# Patient Record
Sex: FEMALE | Race: WHITE | Employment: FULL TIME | URBAN - NONMETROPOLITAN AREA
[De-identification: names, ages, dates, MRNs, and addresses within clinical notes are randomized per-mention and may not be internally consistent; named-entity substitution may affect disease eponyms.]

---

## 2020-04-08 LAB
ABO, EXTERNAL RESULT: NORMAL
C. TRACHOMATIS, EXTERNAL RESULT: NORMAL
HIV, EXTERNAL RESULT: NORMAL
N. GONORRHOEAE, EXTERNAL RESULT: NORMAL
RH FACTOR, EXTERNAL RESULT: NORMAL
RPR, EXTERNAL RESULT: NORMAL
RUBELLA TITER, EXTERNAL RESULT: NORMAL

## 2020-10-13 LAB — GBS, EXTERNAL RESULT: NORMAL

## 2020-11-11 ENCOUNTER — ANESTHESIA EVENT (OUTPATIENT)
Dept: LABOR AND DELIVERY | Age: 35
End: 2020-11-11
Payer: COMMERCIAL

## 2020-11-11 ENCOUNTER — HOSPITAL ENCOUNTER (INPATIENT)
Age: 35
LOS: 2 days | Discharge: HOME | End: 2020-11-13
Attending: MIDWIFE | Admitting: MIDWIFE
Payer: COMMERCIAL

## 2020-11-11 ENCOUNTER — ANESTHESIA (OUTPATIENT)
Dept: LABOR AND DELIVERY | Age: 35
End: 2020-11-11
Payer: COMMERCIAL

## 2020-11-11 PROBLEM — Z34.90 TERM PREGNANCY: Status: ACTIVE | Noted: 2020-11-11

## 2020-11-11 PROBLEM — Z34.90 ENCOUNTER FOR INDUCTION OF LABOR: Status: ACTIVE | Noted: 2020-11-11

## 2020-11-11 LAB
ABSOLUTE EOS #: 0.08 K/UL (ref 0–0.44)
ABSOLUTE IMMATURE GRANULOCYTE: 0.05 K/UL (ref 0–0.3)
ABSOLUTE LYMPH #: 2.17 K/UL (ref 1.1–3.7)
ABSOLUTE MONO #: 0.54 K/UL (ref 0.1–1.2)
AMPHETAMINE SCREEN URINE: NEGATIVE
BARBITURATE SCREEN URINE: NEGATIVE
BASOPHILS # BLD: 0 % (ref 0–2)
BASOPHILS ABSOLUTE: <0.03 K/UL (ref 0–0.2)
BENZODIAZEPINE SCREEN, URINE: NEGATIVE
BUPRENORPHINE URINE: NEGATIVE
CANNABINOID SCREEN URINE: NEGATIVE
COCAINE METABOLITE, URINE: NEGATIVE
DIFFERENTIAL TYPE: ABNORMAL
EOSINOPHILS RELATIVE PERCENT: 1 % (ref 1–4)
GLUCOSE BLD-MCNC: 67 MG/DL (ref 74–100)
HCT VFR BLD CALC: 33.2 % (ref 36.3–47.1)
HEMOGLOBIN: 11.1 G/DL (ref 11.9–15.1)
HEPATITIS B SURFACE ANTIGEN: NONREACTIVE
IMMATURE GRANULOCYTES: 1 %
LYMPHOCYTES # BLD: 22 % (ref 24–43)
MCH RBC QN AUTO: 32.1 PG (ref 25.2–33.5)
MCHC RBC AUTO-ENTMCNC: 33.4 G/DL (ref 28.4–34.8)
MCV RBC AUTO: 96 FL (ref 82.6–102.9)
MDMA URINE: NORMAL
METHADONE SCREEN, URINE: NEGATIVE
METHAMPHETAMINE, URINE: NEGATIVE
MONOCYTES # BLD: 5 % (ref 3–12)
NRBC AUTOMATED: 0 PER 100 WBC
OPIATES, URINE: NEGATIVE
OXYCODONE SCREEN URINE: NEGATIVE
PDW BLD-RTO: 12.8 % (ref 11.8–14.4)
PHENCYCLIDINE, URINE: NEGATIVE
PLATELET # BLD: 142 K/UL (ref 138–453)
PLATELET ESTIMATE: ABNORMAL
PMV BLD AUTO: 11.6 FL (ref 8.1–13.5)
PROPOXYPHENE, URINE: NEGATIVE
RBC # BLD: 3.46 M/UL (ref 3.95–5.11)
RBC # BLD: ABNORMAL 10*6/UL
SEG NEUTROPHILS: 71 % (ref 36–65)
SEGMENTED NEUTROPHILS ABSOLUTE COUNT: 7.23 K/UL (ref 1.5–8.1)
TEST INFORMATION: NORMAL
TRICYCLIC ANTIDEPRESSANTS, UR: NEGATIVE
WBC # BLD: 10.1 K/UL (ref 3.5–11.3)
WBC # BLD: ABNORMAL 10*3/UL

## 2020-11-11 PROCEDURE — 1220000000 HC SEMI PRIVATE OB R&B

## 2020-11-11 PROCEDURE — 87491 CHLMYD TRACH DNA AMP PROBE: CPT

## 2020-11-11 PROCEDURE — 7200000001 HC VAGINAL DELIVERY

## 2020-11-11 PROCEDURE — 36415 COLL VENOUS BLD VENIPUNCTURE: CPT

## 2020-11-11 PROCEDURE — 87340 HEPATITIS B SURFACE AG IA: CPT

## 2020-11-11 PROCEDURE — 87591 N.GONORRHOEAE DNA AMP PROB: CPT

## 2020-11-11 PROCEDURE — 2500000003 HC RX 250 WO HCPCS: Performed by: NURSE ANESTHETIST, CERTIFIED REGISTERED

## 2020-11-11 PROCEDURE — 6360000002 HC RX W HCPCS: Performed by: NURSE ANESTHETIST, CERTIFIED REGISTERED

## 2020-11-11 PROCEDURE — 6360000002 HC RX W HCPCS: Performed by: MIDWIFE

## 2020-11-11 PROCEDURE — 82947 ASSAY GLUCOSE BLOOD QUANT: CPT

## 2020-11-11 PROCEDURE — 2580000003 HC RX 258: Performed by: MIDWIFE

## 2020-11-11 PROCEDURE — 85025 COMPLETE CBC W/AUTO DIFF WBC: CPT

## 2020-11-11 PROCEDURE — 0UQJXZZ REPAIR CLITORIS, EXTERNAL APPROACH: ICD-10-PCS | Performed by: MIDWIFE

## 2020-11-11 PROCEDURE — 80306 DRUG TEST PRSMV INSTRMNT: CPT

## 2020-11-11 PROCEDURE — 3700000025 EPIDURAL BLOCK: Performed by: NURSE ANESTHETIST, CERTIFIED REGISTERED

## 2020-11-11 PROCEDURE — 6370000000 HC RX 637 (ALT 250 FOR IP): Performed by: MIDWIFE

## 2020-11-11 PROCEDURE — 3E033VJ INTRODUCTION OF OTHER HORMONE INTO PERIPHERAL VEIN, PERCUTANEOUS APPROACH: ICD-10-PCS | Performed by: MIDWIFE

## 2020-11-11 RX ORDER — NICOTINE 21 MG/24HR
1 PATCH, TRANSDERMAL 24 HOURS TRANSDERMAL DAILY PRN
Status: DISCONTINUED | OUTPATIENT
Start: 2020-11-11 | End: 2020-11-13 | Stop reason: HOSPADM

## 2020-11-11 RX ORDER — METHYLERGONOVINE MALEATE 0.2 MG/ML
200 INJECTION INTRAVENOUS
Status: ACTIVE | OUTPATIENT
Start: 2020-11-11 | End: 2020-11-11

## 2020-11-11 RX ORDER — ROPIVACAINE HYDROCHLORIDE 2 MG/ML
12 INJECTION, SOLUTION EPIDURAL; INFILTRATION; PERINEURAL ONCE
Status: DISCONTINUED | OUTPATIENT
Start: 2020-11-11 | End: 2020-11-11

## 2020-11-11 RX ORDER — METHYLERGONOVINE MALEATE 0.2 MG/ML
200 INJECTION INTRAVENOUS PRN
Status: DISCONTINUED | OUTPATIENT
Start: 2020-11-11 | End: 2020-11-11

## 2020-11-11 RX ORDER — FERROUS SULFATE 325(65) MG
325 TABLET ORAL 2 TIMES DAILY
COMMUNITY

## 2020-11-11 RX ORDER — CARBOPROST TROMETHAMINE 250 UG/ML
250 INJECTION, SOLUTION INTRAMUSCULAR PRN
Status: DISCONTINUED | OUTPATIENT
Start: 2020-11-11 | End: 2020-11-11

## 2020-11-11 RX ORDER — SEVOFLURANE 250 ML/250ML
1 LIQUID RESPIRATORY (INHALATION) CONTINUOUS PRN
Status: DISCONTINUED | OUTPATIENT
Start: 2020-11-11 | End: 2020-11-11

## 2020-11-11 RX ORDER — SODIUM CHLORIDE 0.9 % (FLUSH) 0.9 %
10 SYRINGE (ML) INJECTION PRN
Status: DISCONTINUED | OUTPATIENT
Start: 2020-11-11 | End: 2020-11-13 | Stop reason: HOSPADM

## 2020-11-11 RX ORDER — MODIFIED LANOLIN
OINTMENT (GRAM) TOPICAL PRN
Status: DISCONTINUED | OUTPATIENT
Start: 2020-11-11 | End: 2020-11-13 | Stop reason: HOSPADM

## 2020-11-11 RX ORDER — LIDOCAINE HYDROCHLORIDE AND EPINEPHRINE 10; 10 MG/ML; UG/ML
INJECTION, SOLUTION INFILTRATION; PERINEURAL
Status: DISCONTINUED
Start: 2020-11-11 | End: 2020-11-11 | Stop reason: WASHOUT

## 2020-11-11 RX ORDER — LIDOCAINE HYDROCHLORIDE 20 MG/ML
INJECTION, SOLUTION EPIDURAL; INFILTRATION; INTRACAUDAL; PERINEURAL PRN
Status: DISCONTINUED | OUTPATIENT
Start: 2020-11-11 | End: 2020-11-11 | Stop reason: SDUPTHER

## 2020-11-11 RX ORDER — CALCIUM CARBONATE 500(1250)
500 TABLET ORAL DAILY
COMMUNITY

## 2020-11-11 RX ORDER — IBUPROFEN 800 MG/1
800 TABLET ORAL EVERY 8 HOURS
Status: DISCONTINUED | OUTPATIENT
Start: 2020-11-11 | End: 2020-11-13 | Stop reason: HOSPADM

## 2020-11-11 RX ORDER — ACETAMINOPHEN 325 MG/1
650 TABLET ORAL EVERY 4 HOURS PRN
Status: DISCONTINUED | OUTPATIENT
Start: 2020-11-11 | End: 2020-11-13 | Stop reason: HOSPADM

## 2020-11-11 RX ORDER — DOCUSATE SODIUM 100 MG/1
100 CAPSULE, LIQUID FILLED ORAL 2 TIMES DAILY
COMMUNITY

## 2020-11-11 RX ORDER — DOCUSATE SODIUM 100 MG/1
100 CAPSULE, LIQUID FILLED ORAL 2 TIMES DAILY PRN
Status: DISCONTINUED | OUTPATIENT
Start: 2020-11-11 | End: 2020-11-13 | Stop reason: HOSPADM

## 2020-11-11 RX ORDER — ACETAMINOPHEN 325 MG/1
650 TABLET ORAL EVERY 4 HOURS PRN
Status: DISCONTINUED | OUTPATIENT
Start: 2020-11-11 | End: 2020-11-11

## 2020-11-11 RX ORDER — SODIUM CHLORIDE 0.9 % (FLUSH) 0.9 %
10 SYRINGE (ML) INJECTION EVERY 12 HOURS SCHEDULED
Status: DISCONTINUED | OUTPATIENT
Start: 2020-11-11 | End: 2020-11-11

## 2020-11-11 RX ORDER — ROPIVACAINE HYDROCHLORIDE 2 MG/ML
INJECTION, SOLUTION EPIDURAL; INFILTRATION; PERINEURAL CONTINUOUS PRN
Status: DISCONTINUED | OUTPATIENT
Start: 2020-11-11 | End: 2020-11-11 | Stop reason: SDUPTHER

## 2020-11-11 RX ORDER — SODIUM CHLORIDE 0.9 % (FLUSH) 0.9 %
10 SYRINGE (ML) INJECTION EVERY 12 HOURS SCHEDULED
Status: DISCONTINUED | OUTPATIENT
Start: 2020-11-11 | End: 2020-11-13 | Stop reason: HOSPADM

## 2020-11-11 RX ORDER — EPHEDRINE SULFATE/0.9% NACL/PF 50 MG/5 ML
5 SYRINGE (ML) INTRAVENOUS EVERY 5 MIN PRN
Status: DISCONTINUED | OUTPATIENT
Start: 2020-11-11 | End: 2020-11-11

## 2020-11-11 RX ORDER — SODIUM CHLORIDE 0.9 % (FLUSH) 0.9 %
10 SYRINGE (ML) INJECTION PRN
Status: DISCONTINUED | OUTPATIENT
Start: 2020-11-11 | End: 2020-11-11

## 2020-11-11 RX ORDER — MISOPROSTOL 100 UG/1
900 TABLET ORAL PRN
Status: DISCONTINUED | OUTPATIENT
Start: 2020-11-11 | End: 2020-11-11

## 2020-11-11 RX ORDER — SODIUM CHLORIDE, SODIUM LACTATE, POTASSIUM CHLORIDE, CALCIUM CHLORIDE 600; 310; 30; 20 MG/100ML; MG/100ML; MG/100ML; MG/100ML
INJECTION, SOLUTION INTRAVENOUS CONTINUOUS
Status: DISCONTINUED | OUTPATIENT
Start: 2020-11-11 | End: 2020-11-11

## 2020-11-11 RX ORDER — SERTRALINE HYDROCHLORIDE 25 MG/1
25 TABLET, FILM COATED ORAL 2 TIMES DAILY
Status: DISCONTINUED | OUTPATIENT
Start: 2020-11-11 | End: 2020-11-13 | Stop reason: HOSPADM

## 2020-11-11 RX ORDER — ONDANSETRON 2 MG/ML
4 INJECTION INTRAMUSCULAR; INTRAVENOUS EVERY 6 HOURS PRN
Status: DISCONTINUED | OUTPATIENT
Start: 2020-11-11 | End: 2020-11-11

## 2020-11-11 RX ORDER — SERTRALINE HYDROCHLORIDE 25 MG/1
25 TABLET, FILM COATED ORAL 2 TIMES DAILY
COMMUNITY

## 2020-11-11 RX ORDER — OMEPRAZOLE 10 MG/1
10 CAPSULE, DELAYED RELEASE ORAL DAILY
COMMUNITY

## 2020-11-11 RX ORDER — LIDOCAINE HYDROCHLORIDE 10 MG/ML
30 INJECTION, SOLUTION EPIDURAL; INFILTRATION; INTRACAUDAL; PERINEURAL PRN
Status: DISCONTINUED | OUTPATIENT
Start: 2020-11-11 | End: 2020-11-11

## 2020-11-11 RX ADMIN — ROPIVACAINE HYDROCHLORIDE 12 ML/HR: 2 INJECTION, SOLUTION EPIDURAL; INFILTRATION at 12:00

## 2020-11-11 RX ADMIN — OXYTOCIN 1 MILLI-UNITS/MIN: 10 INJECTION INTRAVENOUS at 07:45

## 2020-11-11 RX ADMIN — SODIUM CHLORIDE, POTASSIUM CHLORIDE, SODIUM LACTATE AND CALCIUM CHLORIDE: 600; 310; 30; 20 INJECTION, SOLUTION INTRAVENOUS at 07:45

## 2020-11-11 RX ADMIN — SODIUM CHLORIDE, POTASSIUM CHLORIDE, SODIUM LACTATE AND CALCIUM CHLORIDE: 600; 310; 30; 20 INJECTION, SOLUTION INTRAVENOUS at 11:45

## 2020-11-11 RX ADMIN — SERTRALINE HYDROCHLORIDE 25 MG: 25 TABLET ORAL at 21:11

## 2020-11-11 RX ADMIN — LIDOCAINE HYDROCHLORIDE 100 MG: 20 INJECTION, SOLUTION EPIDURAL; INFILTRATION; INTRACAUDAL; PERINEURAL at 12:00

## 2020-11-11 RX ADMIN — IBUPROFEN 800 MG: 800 TABLET ORAL at 19:14

## 2020-11-11 RX ADMIN — SODIUM CHLORIDE, POTASSIUM CHLORIDE, SODIUM LACTATE AND CALCIUM CHLORIDE: 600; 310; 30; 20 INJECTION, SOLUTION INTRAVENOUS at 12:45

## 2020-11-11 RX ADMIN — Medication 40 EACH: at 22:09

## 2020-11-11 RX ADMIN — BENZOCAINE AND LEVOMENTHOL: 200; 5 SPRAY TOPICAL at 22:09

## 2020-11-11 RX ADMIN — LIDOCAINE HYDROCHLORIDE 100 MG: 20 INJECTION, SOLUTION EPIDURAL; INFILTRATION; INTRACAUDAL; PERINEURAL at 11:55

## 2020-11-11 ASSESSMENT — PAIN SCALES - GENERAL: PAINLEVEL_OUTOF10: 2

## 2020-11-11 NOTE — ANESTHESIA PROCEDURE NOTES
Epidural Block    Patient location during procedure: OB  Start time: 11/11/2020 11:44 AM  End time: 11/11/2020 12:00 PM  Reason for block: labor epidural  Staffing  Resident/CRNA: SANTOS Walker CRNA  Performed: resident/CRNA   Preanesthetic Checklist  Completed: patient identified, site marked, pre-op evaluation, timeout performed, IV checked, risks and benefits discussed, monitors and equipment checked, anesthesia consent given, oxygen available and patient being monitored  Epidural  Patient position: sitting  Prep: ChloraPrep and site prepped and draped  Patient monitoring: continuous pulse ox and frequent blood pressure checks  Approach: midline  Location: lumbar (1-5)  Injection technique: RUBEN saline  Procedures: anatomy guided.   Provider prep: mask and sterile gloves  Needle  Needle type: Tuohy   Needle gauge: 17 G  Needle length: 3.5 in  Needle insertion depth: 6 cm  Catheter type: side hole  Catheter size: 19 G  Catheter at skin depth: 6 cm  Test dose: negative  Kit: Hutchison  Lot number: [id number]  Expiration date: 3/30/2021  Assessment  Sensory level: T4  Hemodynamics: stable  Attempts: 1

## 2020-11-11 NOTE — ANESTHESIA PRE PROCEDURE
Department of Anesthesiology  Preprocedure Note       Name:  Jimmy Tai   Age:  30 y.o.  :  1985                                          MRN:  [id number]         Date:  2020      Surgeon: * No surgeons listed *    Procedure: * No procedures listed *    Medications prior to admission:   Prior to Admission medications    Medication Sig Start Date End Date Taking?  Authorizing Provider   metFORMIN (GLUCOPHAGE) 500 MG tablet Take 500 mg by mouth 2 times daily (with meals)   Yes Historical Provider, MD   ferrous sulfate (IRON 325) 325 (65 Fe) MG tablet Take 325 mg by mouth 2 times daily   Yes Historical Provider, MD   omeprazole (PRILOSEC) 10 MG delayed release capsule Take 10 mg by mouth daily   Yes Historical Provider, MD   sertraline (ZOLOFT) 25 MG tablet Take 25 mg by mouth 2 times daily   Yes Historical Provider, MD   docusate sodium (COLACE) 100 MG capsule Take 100 mg by mouth 2 times daily   Yes Historical Provider, MD   calcium carbonate (OSCAL) 500 MG TABS tablet Take 500 mg by mouth daily   Yes Historical Provider, MD   [id number] PO Take by mouth   Yes Historical Provider, MD       Current medications:    Current Facility-Administered Medications   Medication Dose Route Frequency Provider Last Rate Last Dose    lactated ringers infusion   Intravenous Continuous Xin Woodsonkeeper, APRN -  mL/hr at 20 0745      sodium chloride flush 0.9 % injection 10 mL  10 mL Intravenous 2 times per day Xin , APRN - CNM        sodium chloride flush 0.9 % injection 10 mL  10 mL Intravenous PRN Xin , APRN - CNM        lidocaine PF 1 % injection 30 mL  30 mL Other PRN Xin , APRN - CNM        butorphanol (STADOL) injection 1 mg  1 mg Intravenous Q3H PRN Xin , APRN - CNM        nitrous oxide 50% inhalation 1 each  1 each Inhalation Continuous PRN Xin , APRN - CNM        acetaminophen (TYLENOL) tablet 650 mg  650 mg Oral Q4H PRN Xin Stevensoneper, BP Readings from Last 3 Encounters:   11/11/20 124/86       NPO Status:                                                                                 BMI:   Wt Readings from Last 3 Encounters:   No data found for Wt     There is no height or weight on file to calculate BMI.    CBC:   Lab Results   Component Value Date    WBC 10.1 11/11/2020    RBC 3.46 11/11/2020    HGB 11.1 11/11/2020    HCT 33.2 11/11/2020    MCV 96.0 11/11/2020    RDW 12.8 11/11/2020     11/11/2020       CMP: No results found for: NA, K, CL, CO2, BUN, CREATININE, GFRAA, AGRATIO, LABGLOM, GLUCOSE, PROT, CALCIUM, BILITOT, ALKPHOS, AST, ALT    POC Tests:   Recent Labs     11/11/20  0742   POCGLU 67*       Coags: No results found for: PROTIME, INR, APTT    HCG (If Applicable): No results found for: PREGTESTUR, PREGSERUM, HCG, HCGQUANT     ABGs: No results found for: PHART, PO2ART, XYB1GIJ, RAY9LPL, BEART, K6HPKBQV     Type & Screen (If Applicable):  No results found for: LABABO, LABRH    Drug/Infectious Status (If Applicable):  No results found for: HIV, HEPCAB    COVID-19 Screening (If Applicable): No results found for: COVID19      Anesthesia Evaluation  Patient summary reviewed and Nursing notes reviewed no history of anesthetic complications:   Airway: Mallampati: II  TM distance: >3 FB   Neck ROM: full  Mouth opening: > = 3 FB Dental: normal exam         Pulmonary:normal exam    (+) recent URI:                            ROS comment: Patient has a hoarse voice. When I asked if she smoked she said no but she reports she recently tested positive for COVID-19.    Cardiovascular:Negative CV ROS  Exercise tolerance: good (>4 METS),            Beta Blocker:  Not on Beta Blocker         Neuro/Psych:   Negative Neuro/Psych ROS              GI/Hepatic/Renal: Neg GI/Hepatic/Renal ROS            Endo/Other:    (+) Diabetes (gestational.)Type II DM, , .                 Abdominal:           Vascular: negative vascular ROS. Anesthesia Plan      epidural     ASA 2             Anesthetic plan and risks discussed with patient. Plan discussed with CRNA.                   SANTOS Gagnon - CRNA   11/11/2020

## 2020-11-11 NOTE — PLAN OF CARE
Problem: Anxiety:  Goal: Level of anxiety will decrease  Description: Level of anxiety will decrease  2020 by Flor Layton RN  Outcome: Completed  2020 by Flor Layton RN  Outcome: Ongoing     Problem: Breathing Pattern - Ineffective:  Goal: Able to breathe comfortably  Description: Able to breathe comfortably  2020 by Flor Layton RN  Outcome: Completed  2020 by Flor Layton RN  Outcome: Ongoing     Problem: Infection - Intrapartum Infection:  Goal: Will show no infection signs and symptoms  Description: Will show no infection signs and symptoms  2020 by Flor Layton RN  Outcome: Completed  2020 by Flor Layton RN  Outcome: Ongoing     Problem: Labor Process - Prolonged:  Goal: Labor progression, first stage, within specified pattern  Description: Labor progression, first stage, within specified pattern  2020 by Flor Layton RN  Outcome: Completed  2020 by Flor Layton RN  Outcome: Ongoing  Goal: Labor progession, second stage, within specified pattern  Description: Labor progession, second stage, within specified pattern  2020 by Flor Layton RN  Outcome: Completed  2020 by Flor Layton RN  Outcome: Ongoing  Goal: Uterine contractions within specified parameters  Description: Uterine contractions within specified parameters  2020 by Flor Layton RN  Outcome: Completed  2020 by Flor Layton RN  Outcome: Ongoing     Problem:  Screening:  Goal: Ability to make informed decisions regarding treatment has improved  Description: Ability to make informed decisions regarding treatment has improved  2020 by Flor Layton RN  Outcome: Completed  2020 by Flor Layton RN  Outcome: Ongoing     Problem: Pain - Acute:  Goal: Pain level will decrease  Description: Pain level will decrease  11/11/2020 1710 by Jenny Garay RN  Outcome: Completed  11/11/2020 0931 by Jenny Garay RN  Outcome: Ongoing  Goal: Able to cope with pain  Description: Able to cope with pain  11/11/2020 1710 by Jenny Garay RN  Outcome: Completed  11/11/2020 0931 by Jenny Garay RN  Outcome: Ongoing     Problem: Tissue Perfusion - Uteroplacental, Altered:  Description: [TRUNCATED] For intrapartum patients with recurrent variable decelerations of the fetal heart rate, consider transcervical amnioinfusion. For patients in labor, avoid prophylactic use of continuous maternal oxygen supplementation to prevent nonreassu . ..   Goal: Absence of abnormal fetal heart rate pattern  Description: Absence of abnormal fetal heart rate pattern  11/11/2020 1710 by Jenny Garay RN  Outcome: Completed  11/11/2020 0931 by Jenny Garay RN  Outcome: Ongoing     Problem: Urinary Retention:  Goal: Experiences of bladder distention will decrease  Description: Experiences of bladder distention will decrease  11/11/2020 1710 by Jenny Garay RN  Outcome: Completed  11/11/2020 0931 by Jenny Garay RN  Outcome: Ongoing  Goal: Urinary elimination within specified parameters  Description: Urinary elimination within specified parameters  11/11/2020 1710 by Jenny Garay RN  Outcome: Completed  11/11/2020 0931 by Jenny Garay RN  Outcome: Ongoing

## 2020-11-11 NOTE — L&D DELIVERY NOTE
Jenny Garay, RN Registered Nurse    Guzman Amos LPN LPN      Cord    Vessels:  3 Vessels  Complications:  Nuchal  Nuchal intervention:  reduced  Nuchal cord description:  loose nuchal cord  Number of loops:  1  Delayed cord clamping?:  Yes  Cord clamped date/time:  2020 1419  Cord blood disposition:  Lab  Gases sent?:  No  Stem cell collection (by provider): No     Placenta    Date/time:  2020 16:21:00  Removal:  Spontaneous  Appearance:  Intact  Disposition:  Discarded     Delivery Resuscitation    Method:  Stimulation     Apgars    Living status:  Living  Apgars   1 Minute:   5 Minute:   10 Minute 15 Minute 20 Minute   Skin Color: 1  1       Heart Rate: 2  2       Reflex Irritability: 2  2       Muscle Tone: 2  2       Respiratory Effort: 2  2       Total: 9  9               Apgars Assigned By:  ANDREW HERRERA RN     Skin to Skin    Skin to skin initiation date/time: 20 16:19:00   Skin to skin with: Mother  Skin to skin end date/time:        [place] Measurements       Delivery Information    Episiotomy:  None  Perineal lacerations:  None    Labial laceration:  bilateral Repaired:  Yes   Vaginal laceration:  No    Cervical laceration:  No    Vaginal delivery est. blood loss (mL):  200  Surgical or additional est. blood loss (mL):  0 (View Only):  Edit in Flowsheets   Combined est. blood loss (mL):  200     Vaginal Delivery Counts    Initial count personnel:  Saad Alcala  Initial count verified by:  ANDREW HERRERA RN   4x4:   Needles:   Instruments:   Lap Pads:   Sponges:     Initial counts:          Final counts:          Final count personnel:  TAMI KISER  Final count verified by:  Maurisio Mars  Accurate final count?:  Yes     Other Procedures    Procedures:  None          Department of Obstetrics and Gynecology  Spontaneous Vaginal Delivery Note          Pre-operative Diagnosis:  Active Problems:    Encounter for induction of labor    Term pregnancy  Resolved Problems:    * No resolved hospital problems. *      Post-operative Diagnosis:  Living  infant(s) and Male    Blood Type and Rh: AB+       Condition:  infant stable and mother stable remain bonding in delivery room      Details of Procedure: The patient is a 30 y.o. female at 38w11d   OB History        4    Para   3    Term   3            AB        Living   3       SAB        TAB        Ectopic        Molar        Multiple        Live Births   3             who was admitted for induction. She received the following interventions: Edyta Lacrosse She was known to be GBS negative and did not receive antibiotic prophylaxis. The patient progressed well,did receive an epidural, became complete and started to push. After pushing for 10 minutes  the fetal head was at the perineum,  the rest of the infant delivered atraumatically, placed on mother abdomen. 1Nucal Cord was noted. Cord was clamped and cut per Iqra Suarez, father of the baby . The delivery of the placenta was spontaneous. Placenta was inspectedintact. The perineum and vagina were explored and a bilateral labial laceration and suresh clitoral laceration were noted. All lacerations were repaired and 4-0 vicryl on an S-H needles was used for repair. Patient had great pain relief from epidural, and no local was needed. Hemostasis was achieved after lacerations were repaired. Patient tolerated procedure well.

## 2020-11-11 NOTE — H&P
Department of Obstetrics and Gynecology   Obstetrics History and Physical  H&P Admission Inpatient  Note        CHIEF COMPLAINT:  i'm uncomfortable and I want to be induced. HISTORY OF PRESENT ILLNESS:      The patient is a 30 y.o. female at 38w11d. OB History        4    Para   3    Term   3            AB        Living   3       SAB        TAB        Ectopic        Molar        Multiple        Live Births   3            Patient presents with a chief complaint as above and is being admitted for induction    Estimated Due Date: Estimated Date of Delivery: 20    PRENATAL CARE:    Complicated by: gestational diabetes class A , non compliance with diet and reporting blood sugar readings. PAST OB HISTORY:  OB History        4    Para   3    Term   3            AB        Living   3       SAB        TAB        Ectopic        Molar        Multiple        Live Births   3                Past Medical History:    No past medical history on file. Past Surgical History:        Procedure Laterality Date    ABDOMEN SURGERY      umbilical hernia and abdominal hernia and left ovary removed.  CHOLECYSTECTOMY       Allergies:  Patient has no known allergies.     Social History:    Social History     Socioeconomic History    Marital status:      Spouse name: Not on file    Number of children: Not on file    Years of education: Not on file    Highest education level: Not on file   Occupational History    Not on file   Social Needs    Financial resource strain: Not on file    Food insecurity     Worry: Not on file     Inability: Not on file   [employer] needs     Medical: Not on file     Non-medical: Not on file   Tobacco Use    Smoking status: Current Every Day Smoker     Packs/day: 0.50     Types: Cigarettes    Smokeless tobacco: Never Used   Substance and Sexual Activity    Alcohol use: Not Currently    Drug use: Never    Sexual activity: Yes     Partners: Male Lifestyle    Physical activity     Days per week: Not on file     Minutes per session: Not on file    Stress: Not on file   Relationships    Social connections     Talks on phone: Not on file     Gets together: Not on file     Attends [employer] service: Not on file     Active member of club or organization: Not on file     Attends meetings of clubs or organizations: Not on file     Relationship status: Not on file    Intimate partner violence     Fear of current or ex partner: Not on file     Emotionally abused: Not on file     Physically abused: Not on file     Forced sexual activity: Not on file   Other Topics Concern    Not on file   Social History Narrative    Not on file     Family History:       Problem Relation Age of Onset    Anemia Mother     High Blood Pressure Mother      Medications Prior to Admission:  Medications Prior to Admission: metFORMIN (GLUCOPHAGE) 500 MG tablet, Take 500 mg by mouth 2 times daily (with meals)  ferrous sulfate (IRON 325) 325 (65 Fe) MG tablet, Take 325 mg by mouth 2 times daily  omeprazole (PRILOSEC) 10 MG delayed release capsule, Take 10 mg by mouth daily  sertraline (ZOLOFT) 25 MG tablet, Take 25 mg by mouth 2 times daily  docusate sodium (COLACE) 100 MG capsule, Take 100 mg by mouth 2 times daily  calcium carbonate (OSCAL) 500 MG TABS tablet, Take 500 mg by mouth daily  [id number] PO, Take by mouth    REVIEW OF SYSTEMS:    CONSTITUTIONAL:  negative  RESPIRATORY:  negative  CARDIOVASCULAR:  negative  GASTROINTESTINAL:  negative  ALLERGIC/IMMUNOLOGIC:  negative  NEUROLOGICAL:  negative  BEHAVIOR/PSYCH:  negative    PHYSICAL EXAM:  Vitals:    11/11/20 1145 11/11/20 1152 11/11/20 1154 11/11/20 1156   BP: (!) 144/80 122/75 126/81 130/83   Pulse: 74  64 57   Resp: 18 18 18 18   Temp:       TempSrc:         General appearance:  awake, alert, cooperative, no apparent distress, and appears stated age  Neurologic:  Awake, alert, oriented to name, place and time. Lungs: No increased work of breathing, good air exchange  Abdomen:  Soft, non tender, gravid, consistent with her gestational age, EFW by Leopald's manouever was 7 lbs    Fetal heart rate:  Reassuring.   Pelvis:  Adequate pelvis  Cervix: 2-3 in my office  60 medium -3  Contraction frequency:  0 minutes    Membranes:  Intact    Labs:  Blood Type: AB+     Rubella:  No results found for: RUBG  T. Pallidium, IGG:  No results found for: TREPG  Sickle Cell Screen: No results found for: SICKLE  Hepatitis B Surface Antigen: No results found for: HEPBSAG  HIV:  No results found for: KYR40DD       Results for orders placed or performed during the hospital encounter of 11/11/20   CBC auto differential   Result Value Ref Range    WBC 10.1 3.5 - 11.3 k/uL    RBC 3.46 (L) 3.95 - 5.11 m/uL    Hemoglobin 11.1 (L) 11.9 - 15.1 g/dL    Hematocrit 33.2 (L) 36.3 - 47.1 %    MCV 96.0 82.6 - 102.9 fL    MCH 32.1 25.2 - 33.5 pg    MCHC 33.4 28.4 - 34.8 g/dL    RDW 12.8 11.8 - 14.4 %    Platelets 606 980 - 796 k/uL    MPV 11.6 8.1 - 13.5 fL    NRBC Automated 0.0 0.0 per 100 WBC    Differential Type NOT REPORTED     Seg Neutrophils 71 (H) 36 - 65 %    Lymphocytes 22 (L) 24 - 43 %    Monocytes 5 3 - 12 %    Eosinophils % 1 1 - 4 %    Basophils 0 0 - 2 %    Immature Granulocytes 1 (H) 0 %    Segs Absolute 7.23 1.50 - 8.10 k/uL    Absolute Lymph # 2.17 1.10 - 3.70 k/uL    Absolute Mono # 0.54 0.10 - 1.20 k/uL    Absolute Eos # 0.08 0.00 - 0.44 k/uL    Basophils Absolute <0.03 0.00 - 0.20 k/uL    Absolute Immature Granulocyte 0.05 0.00 - 0.30 k/uL    WBC Morphology NOT REPORTED     RBC Morphology NOT REPORTED     Platelet Estimate NOT REPORTED    DRUG SCREEN MULTI URINE   Result Value Ref Range    Amphetamine Screen, Ur NEGATIVE NEGATIVE    Barbiturate Screen, Ur NEGATIVE NEGATIVE    Benzodiazepine Screen, Urine NEGATIVE NEGATIVE    Cocaine Metabolite, Urine NEGATIVE NEGATIVE    Methadone Screen, Urine NEGATIVE NEGATIVE    Opiates, Urine NEGATIVE NEGATIVE    Phencyclidine, Urine NEGATIVE NEGATIVE    Propoxyphene, Urine NEGATIVE NEGATIVE    Cannabinoid Scrn, Ur NEGATIVE NEGATIVE    Oxycodone Screen, Ur NEGATIVE NEGATIVE    Methamphetamine, Urine NEGATIVE NEGATIVE    Tricyclic Antidepressants, Urine NEGATIVE NEGATIVE    MDMA, Urine NOT REPORTED NEGATIVE    Buprenorphine Urine NEGATIVE NEGATIVE    Test Information NOT REPORTED    Glucose, Whole Blood   Result Value Ref Range    POC Glucose 67 (L) 74 - 100 mg/dL       ASSESSMENT AND PLAN:    Estimated length of stay: less than two midnights     Active Problems:    Encounter for induction of labor  Plan: pitocin induction and AROM      Labor: Admit, anticipate normal delivery, routine labor orders  Fetus: Reassuring, Cat 1  GBS: No  Other: IV hydration and antiemetics, pitocin  Dr [place] informed of induction scheduled on 11-10-20      Esmer Rojo 12:53 PM

## 2020-11-11 NOTE — FLOWSHEET NOTE
Writer speaks with MORRIS Fu CNM. Would like a POCT now. Told staff had trouble getting IV started, and pitocin is starting now per Annabella Lindquist RN. Let her know when she is 6 cm.

## 2020-11-11 NOTE — FLOWSHEET NOTE
This nurse spoke to MORRIS Pepe CNM by phone on night shift. Told CNM we were unable to find a Hep B or -chlamydia result after the positive results. May run Hep B lab and GC/chlamydia via urine. If patient is 4 cm or greater call MORRIS Pepe, if cervix is less, may use routine pitocin orders.

## 2020-11-12 LAB
C. TRACHOMATIS DNA ,URINE: ABNORMAL
N. GONORRHOEAE DNA, URINE: NEGATIVE
SPECIMEN DESCRIPTION: ABNORMAL

## 2020-11-12 PROCEDURE — 6370000000 HC RX 637 (ALT 250 FOR IP): Performed by: MIDWIFE

## 2020-11-12 PROCEDURE — 1220000000 HC SEMI PRIVATE OB R&B

## 2020-11-12 PROCEDURE — 99024 POSTOP FOLLOW-UP VISIT: CPT | Performed by: ADVANCED PRACTICE MIDWIFE

## 2020-11-12 RX ORDER — AZITHROMYCIN 250 MG/1
1000 TABLET, FILM COATED ORAL DAILY
Status: COMPLETED | OUTPATIENT
Start: 2020-11-12 | End: 2020-11-12

## 2020-11-12 RX ADMIN — ACETAMINOPHEN 650 MG: 325 TABLET, FILM COATED ORAL at 12:38

## 2020-11-12 RX ADMIN — SERTRALINE HYDROCHLORIDE 25 MG: 25 TABLET ORAL at 09:32

## 2020-11-12 RX ADMIN — IBUPROFEN 800 MG: 800 TABLET ORAL at 15:57

## 2020-11-12 RX ADMIN — AZITHROMYCIN MONOHYDRATE 1000 MG: 250 TABLET ORAL at 15:58

## 2020-11-12 RX ADMIN — DOCUSATE SODIUM 100 MG: 100 CAPSULE, LIQUID FILLED ORAL at 09:32

## 2020-11-12 RX ADMIN — Medication: at 07:33

## 2020-11-12 RX ADMIN — ACETAMINOPHEN 650 MG: 325 TABLET, FILM COATED ORAL at 20:12

## 2020-11-12 RX ADMIN — IBUPROFEN 800 MG: 800 TABLET ORAL at 07:32

## 2020-11-12 RX ADMIN — SERTRALINE HYDROCHLORIDE 25 MG: 25 TABLET ORAL at 20:12

## 2020-11-12 RX ADMIN — ACETAMINOPHEN 650 MG: 325 TABLET, FILM COATED ORAL at 00:56

## 2020-11-12 ASSESSMENT — PAIN SCALES - GENERAL
PAINLEVEL_OUTOF10: 4
PAINLEVEL_OUTOF10: 4
PAINLEVEL_OUTOF10: 5
PAINLEVEL_OUTOF10: 2
PAINLEVEL_OUTOF10: 5

## 2020-11-12 NOTE — PLAN OF CARE
Problem: Discharge Planning:  Goal: Discharged to appropriate level of care  Description: Discharged to appropriate level of care  Outcome: Ongoing     Problem: Constipation:  Goal: Bowel elimination is within specified parameters  Description: Bowel elimination is within specified parameters  Outcome: Ongoing     Problem: Fluid Volume - Imbalance:  Goal: Absence of imbalanced fluid volume signs and symptoms  Description: Absence of imbalanced fluid volume signs and symptoms  Outcome: Ongoing  Goal: Absence of postpartum hemorrhage signs and symptoms  Description: Absence of postpartum hemorrhage signs and symptoms  Outcome: Ongoing     Problem: Infection - Risk of, Puerperal Infection:  Goal: Will show no infection signs and symptoms  Description: Will show no infection signs and symptoms  Outcome: Ongoing     Problem: Mood - Altered:  Goal: Mood stable  Description: Mood stable  Outcome: Ongoing     Problem: Pain - Acute:  Goal: Pain level will decrease  Description: Pain level will decrease  Outcome: Ongoing     Problem: Anxiety:  Goal: Level of anxiety will decrease  Description: Level of anxiety will decrease  11/11/2020 1710 by Laura Gallagher RN  Outcome: Completed  11/11/2020 0931 by Laura Gallagher RN  Outcome: Ongoing     Problem: Breathing Pattern - Ineffective:  Goal: Able to breathe comfortably  Description: Able to breathe comfortably  11/11/2020 1710 by Laura Gallagher RN  Outcome: Completed  11/11/2020 0931 by Laura Gallagher RN  Outcome: Ongoing     Problem: Fluid Volume - Imbalance:  Goal: Absence of imbalanced fluid volume signs and symptoms  Description: Absence of imbalanced fluid volume signs and symptoms  11/11/2020 1710 by Laura Gallagher RN  Outcome: Completed  11/11/2020 0931 by Laura Gallagher RN  Outcome: Ongoing  Goal: Absence of intrapartum hemorrhage signs and symptoms  Description: Absence of intrapartum hemorrhage signs and symptoms  11/11/2020 1710 by Vance Doe RN  Outcome: Completed  2020 2848 by Vance Doe RN  Outcome: Ongoing     Problem: Infection - Intrapartum Infection:  Goal: Will show no infection signs and symptoms  Description: Will show no infection signs and symptoms  2020 by Vance Doe RN  Outcome: Completed  2020 by Vance Doe RN  Outcome: Ongoing     Problem: Labor Process - Prolonged:  Goal: Labor progression, first stage, within specified pattern  Description: Labor progression, first stage, within specified pattern  2020 by Vance Doe RN  Outcome: Completed  2020 by Vance Doe RN  Outcome: Ongoing  Goal: Labor progession, second stage, within specified pattern  Description: Labor progession, second stage, within specified pattern  2020 by Vance Doe RN  Outcome: Completed  2020 by Vance Doe RN  Outcome: Ongoing  Goal: Uterine contractions within specified parameters  Description: Uterine contractions within specified parameters  2020 by Vance Doe RN  Outcome: Completed  2020 by Vance Doe RN  Outcome: Ongoing     Problem:  Screening:  Goal: Ability to make informed decisions regarding treatment has improved  Description: Ability to make informed decisions regarding treatment has improved  2020 by Vance Doe RN  Outcome: Completed  2020 by Vance Doe RN  Outcome: Ongoing     Problem: Pain - Acute:  Goal: Pain level will decrease  Description: Pain level will decrease  2020 by Vance Doe RN  Outcome: Completed  2020 by Vance Doe RN  Outcome: Ongoing  Goal: Able to cope with pain  Description: Able to cope with pain  2020 by Vance Doe RN  Outcome: Completed  2020 by Vance Doe RN  Outcome: Ongoing     Problem: Tissue Perfusion - Uteroplacental, Altered:  Description: [TRUNCATED] For intrapartum patients with recurrent variable decelerations of the fetal heart rate, consider transcervical amnioinfusion. For patients in labor, avoid prophylactic use of continuous maternal oxygen supplementation to prevent nonreassu . ..   Goal: Absence of abnormal fetal heart rate pattern  Description: Absence of abnormal fetal heart rate pattern  11/11/2020 1710 by Beth Avery RN  Outcome: Completed  11/11/2020 0931 by Beth Avery RN  Outcome: Ongoing     Problem: Urinary Retention:  Goal: Experiences of bladder distention will decrease  Description: Experiences of bladder distention will decrease  11/11/2020 1710 by Beth Avery RN  Outcome: Completed  11/11/2020 0931 by Beth Avery RN  Outcome: Ongoing  Goal: Urinary elimination within specified parameters  Description: Urinary elimination within specified parameters  11/11/2020 1710 by Beth Avery RN  Outcome: Completed  11/11/2020 0931 by Beth Avery RN  Outcome: Ongoing

## 2020-11-12 NOTE — DISCHARGE SUMMARY
Obstetrical Discharge Form        Gestational Age:39w5d    Antepartum complications: none    Date of Delivery: 20    Type of Delivery:        Delivered By:  Yesika GRAHAM CNM    Assisted By:N/A}      Baby: male    Anesthesia:  epidural      Intrapartum complications: None    Feeding method: breast    Hepatitis B Surface Antigen:   Hepatitis B Surface Ag   Date Value Ref Range Status   2020 NONREACTIVE NONREACTIVE Final     HIV:  No results found for: [id number]    Results for orders placed or performed during the hospital encounter of 20   CBC auto differential   Result Value Ref Range    WBC 10.1 3.5 - 11.3 k/uL    RBC 3.46 (L) 3.95 - 5.11 m/uL    Hemoglobin 11.1 (L) 11.9 - 15.1 g/dL    Hematocrit 33.2 (L) 36.3 - 47.1 %    MCV 96.0 82.6 - 102.9 fL    MCH 32.1 25.2 - 33.5 pg    MCHC 33.4 28.4 - 34.8 g/dL    RDW 12.8 11.8 - 14.4 %    Platelets 479 152 - 108 k/uL    MPV 11.6 8.1 - 13.5 fL    NRBC Automated 0.0 0.0 per 100 WBC    Differential Type NOT REPORTED     Seg Neutrophils 71 (H) 36 - 65 %    Lymphocytes 22 (L) 24 - 43 %    Monocytes 5 3 - 12 %    Eosinophils % 1 1 - 4 %    Basophils 0 0 - 2 %    Immature Granulocytes 1 (H) 0 %    Segs Absolute 7.23 1.50 - 8.10 k/uL    Absolute Lymph # 2.17 1.10 - 3.70 k/uL    Absolute Mono # 0.54 0.10 - 1.20 k/uL    Absolute Eos # 0.08 0.00 - 0.44 k/uL    Basophils Absolute <0.03 0.00 - 0.20 k/uL    Absolute Immature Granulocyte 0.05 0.00 - 0.30 k/uL    WBC Morphology NOT REPORTED     RBC Morphology NOT REPORTED     Platelet Estimate NOT REPORTED    DRUG SCREEN MULTI URINE   Result Value Ref Range    Amphetamine Screen, Ur NEGATIVE NEGATIVE    Barbiturate Screen, Ur NEGATIVE NEGATIVE    Benzodiazepine Screen, Urine NEGATIVE NEGATIVE    Cocaine Metabolite, Urine NEGATIVE NEGATIVE    Methadone Screen, Urine NEGATIVE NEGATIVE    Opiates, Urine NEGATIVE NEGATIVE    Phencyclidine, Urine NEGATIVE NEGATIVE    Propoxyphene, Urine NEGATIVE NEGATIVE    Cannabinoid Scrn, Ur NEGATIVE NEGATIVE    Oxycodone Screen, Ur NEGATIVE NEGATIVE    Methamphetamine, Urine NEGATIVE NEGATIVE    Tricyclic Antidepressants, Urine NEGATIVE NEGATIVE    MDMA, Urine NOT REPORTED NEGATIVE    Buprenorphine Urine NEGATIVE NEGATIVE    Test Information NOT REPORTED    Hepatitis B surface antigen   Result Value Ref Range    Hepatitis B Surface Ag NONREACTIVE NONREACTIVE   Glucose, Whole Blood   Result Value Ref Range    POC Glucose 67 (L) 74 - 100 mg/dL         Postpartum complications: none    Discharge Medication:    [facility] Medication Instructions VAK:[id number]    Printed on:11/12/20 8909   Medication Information                      calcium carbonate (OSCAL) 500 MG TABS tablet  Take 500 mg by mouth daily             docusate sodium (COLACE) 100 MG capsule  Take 100 mg by mouth 2 times daily             ferrous sulfate (IRON 325) 325 (65 Fe) MG tablet  Take 325 mg by mouth 2 times daily             omeprazole (PRILOSEC) 10 MG delayed release capsule  Take 10 mg by mouth daily             [id number] PO  Take by mouth             sertraline (ZOLOFT) 25 MG tablet  Take 25 mg by mouth 2 times daily                    Admit date: 11/11/2020  5:58 AM    Discharge Date: 11/12/2020    Discharged to: Home in stable condition        Plan:   Follow up in 2 week(s) for post partum visit

## 2020-11-12 NOTE — ANESTHESIA POSTPROCEDURE EVALUATION
Department of Anesthesiology  Postprocedure Note    Patient: Juan David Reno  MRN: [id number]  YOB: 1985  Date of evaluation: 11/12/2020  Time:  11:59 AM     Procedure Summary     Date:  11/11/20 Room / Location:      Anesthesia Start:  1144 Anesthesia Stop:  1618    Procedure:  Labor Analgesia Diagnosis:      Scheduled Providers:   Responsible Provider:  SANTOS Leon CRNA    Anesthesia Type:  epidural ASA Status:  2          Anesthesia Type: No value filed. Jennifer Phase I: Jennifer Score: 9    Jennifer Phase II: Jennifer Score: 9    Last vitals: Reviewed and per EMR flowsheets.        Anesthesia Post Evaluation    Patient location during evaluation: floor  Patient participation: complete - patient participated  Level of consciousness: awake  Airway patency: patent  Nausea & Vomiting: no nausea and no vomiting  Complications: no  Cardiovascular status: blood pressure returned to baseline  Respiratory status: acceptable  Hydration status: euvolemic  Comments: Pt denies headache, sensory and motor function returned to baseline

## 2020-11-12 NOTE — PLAN OF CARE
Problem: Discharge Planning:  Goal: Discharged to appropriate level of care  Description: Discharged to appropriate level of care  Outcome: Met This Shift     Problem: Constipation:  Goal: Bowel elimination is within specified parameters  Description: Bowel elimination is within specified parameters  Outcome: Met This Shift     Problem: Fluid Volume - Imbalance:  Goal: Absence of imbalanced fluid volume signs and symptoms  Description: Absence of imbalanced fluid volume signs and symptoms  Outcome: Met This Shift  Goal: Absence of postpartum hemorrhage signs and symptoms  Description: Absence of postpartum hemorrhage signs and symptoms  Outcome: Met This Shift     Problem: Infection - Risk of, Puerperal Infection:  Goal: Will show no infection signs and symptoms  Description: Will show no infection signs and symptoms  Outcome: Met This Shift     Problem: Mood - Altered:  Goal: Mood stable  Description: Mood stable  Outcome: Met This Shift     Problem: Pain - Acute:  Goal: Pain level will decrease  Description: Pain level will decrease  Outcome: Met This Shift     Problem: Pain:  Goal: Pain level will decrease  Description: Pain level will decrease  Outcome: Met This Shift  Goal: Control of acute pain  Description: Control of acute pain  Outcome: Met This Shift  Goal: Control of chronic pain  Description: Control of chronic pain  Outcome: Met This Shift

## 2020-11-13 VITALS
OXYGEN SATURATION: 100 % | WEIGHT: 188 LBS | HEART RATE: 77 BPM | BODY MASS INDEX: 32.1 KG/M2 | HEIGHT: 64 IN | RESPIRATION RATE: 16 BRPM | TEMPERATURE: 97.8 F | DIASTOLIC BLOOD PRESSURE: 64 MMHG | SYSTOLIC BLOOD PRESSURE: 103 MMHG

## 2020-11-13 PROCEDURE — 99024 POSTOP FOLLOW-UP VISIT: CPT | Performed by: ADVANCED PRACTICE MIDWIFE

## 2020-11-13 PROCEDURE — 6370000000 HC RX 637 (ALT 250 FOR IP): Performed by: MIDWIFE

## 2020-11-13 RX ADMIN — IBUPROFEN 800 MG: 800 TABLET ORAL at 08:10

## 2020-11-13 RX ADMIN — IBUPROFEN 800 MG: 800 TABLET ORAL at 00:37

## 2020-11-13 RX ADMIN — SERTRALINE HYDROCHLORIDE 25 MG: 25 TABLET ORAL at 08:10

## 2020-11-13 RX ADMIN — ACETAMINOPHEN 650 MG: 325 TABLET, FILM COATED ORAL at 07:33

## 2020-11-13 ASSESSMENT — PAIN SCALES - GENERAL
PAINLEVEL_OUTOF10: 3
PAINLEVEL_OUTOF10: 3

## 2020-11-13 NOTE — DISCHARGE SUMMARY
Obstetrical Discharge Form        Gestational Age:39w5d     Antepartum complications: none     Date of Delivery: 20     Type of Delivery:          Delivered By:  Ray Morales. My GRAHAM CNHANSEL     Assisted By:N/A}        Baby: male     Anesthesia:  epidural        Intrapartum complications: None     Feeding method: breast    Hepatitis B Surface Antigen:   Hepatitis B Surface Ag   Date Value Ref Range Status   2020 NONREACTIVE NONREACTIVE Final     HIV:  No results found for: [id number]    Results for orders placed or performed during the hospital encounter of 20   C.trachomatis N.gonorrhoeae DNA, Urine    Specimen: Urine   Result Value Ref Range    Specimen Description . URINE     C. trachomatis DNA ,Urine (A) NEGATIVE     POSITIVE: CHLAMYDIA TRACHOMATIS DNA detected by nucleic acid amplification.     N. gonorrhoeae DNA, Urine NEGATIVE NEGATIVE   CBC auto differential   Result Value Ref Range    WBC 10.1 3.5 - 11.3 k/uL    RBC 3.46 (L) 3.95 - 5.11 m/uL    Hemoglobin 11.1 (L) 11.9 - 15.1 g/dL    Hematocrit 33.2 (L) 36.3 - 47.1 %    MCV 96.0 82.6 - 102.9 fL    MCH 32.1 25.2 - 33.5 pg    MCHC 33.4 28.4 - 34.8 g/dL    RDW 12.8 11.8 - 14.4 %    Platelets 662 192 - 166 k/uL    MPV 11.6 8.1 - 13.5 fL    NRBC Automated 0.0 0.0 per 100 WBC    Differential Type NOT REPORTED     Seg Neutrophils 71 (H) 36 - 65 %    Lymphocytes 22 (L) 24 - 43 %    Monocytes 5 3 - 12 %    Eosinophils % 1 1 - 4 %    Basophils 0 0 - 2 %    Immature Granulocytes 1 (H) 0 %    Segs Absolute 7.23 1.50 - 8.10 k/uL    Absolute Lymph # 2.17 1.10 - 3.70 k/uL    Absolute Mono # 0.54 0.10 - 1.20 k/uL    Absolute Eos # 0.08 0.00 - 0.44 k/uL    Basophils Absolute <0.03 0.00 - 0.20 k/uL    Absolute Immature Granulocyte 0.05 0.00 - 0.30 k/uL    WBC Morphology NOT REPORTED     RBC Morphology NOT REPORTED     Platelet Estimate NOT REPORTED    DRUG SCREEN MULTI URINE   Result Value Ref Range    Amphetamine Screen, Ur NEGATIVE NEGATIVE    Barbiturate Screen, Ur

## 2020-11-13 NOTE — PLAN OF CARE
Problem: Constipation:  Goal: Bowel elimination is within specified parameters  Description: Bowel elimination is within specified parameters  Outcome: Ongoing     Problem: Fluid Volume - Imbalance:  Goal: Absence of imbalanced fluid volume signs and symptoms  Description: Absence of imbalanced fluid volume signs and symptoms  Outcome: Ongoing  Goal: Absence of postpartum hemorrhage signs and symptoms  Description: Absence of postpartum hemorrhage signs and symptoms  Outcome: Ongoing     Problem: Infection - Risk of, Puerperal Infection:  Goal: Will show no infection signs and symptoms  Description: Will show no infection signs and symptoms  Outcome: Ongoing     Problem: Mood - Altered:  Goal: Mood stable  Description: Mood stable  Outcome: Ongoing     Problem: Pain - Acute:  Goal: Pain level will decrease  Description: Pain level will decrease  Outcome: Ongoing     Problem: Pain:  Description: Pain management should include both nonpharmacologic and pharmacologic interventions.   Goal: Pain level will decrease  Description: Pain level will decrease  11/12/2020 1808 by Selin Olivo RN  Outcome: Completed  11/12/2020 1807 by Selin Olivo RN  Outcome: Met This Shift  Goal: Control of acute pain  Description: Control of acute pain  11/12/2020 1808 by Selin Olivo RN  Outcome: Completed  11/12/2020 1807 by Selin Olivo RN  Outcome: Met This Shift  Goal: Control of chronic pain  Description: Control of chronic pain  11/12/2020 1808 by Selin Olivo RN  Outcome: Completed  11/12/2020 1807 by Selin Olivo RN  Outcome: Met This Shift

## 2020-11-13 NOTE — PROGRESS NOTES
Alanna Guevara CNM in room for CHU
Asael Vergara CNM notified about pitocin being up to 19mu at this time
Beverley Varghese CNM in room for AROM
Bruno Cardoso removed to under patients legs.  Pt repositioned
CRNA in pt's room for epidural
Department of Obstetrics and Gynecology   Progress Note      SUBJECTIVE:  i'm doing good, I feel nothing and i'm really comfortable. OBJECTIVE:      Vitals:    20 1145 20 1152 20 1154 20 1156   BP: (!) 144/80 122/75 126/81 130/83   Pulse: 74  64 57   Resp: 18 18 18 18   Temp:       TempSrc:           Fetal heart rate:       Baseline Heart Rate:  135        Accelerations:  present       Long Term Variability:  moderate       Decelerations:  absent         Contraction frequency: 2-3 minutes    Membranes:  Ruptured clear fluid, sterile finger cot used with exam and AROM performed with return of moderate amount of clear, odorless fluid, fetal heart tones are 130\"s before, during and after ROM      Cervix:         Dilation:  4 cm         Effacement:  90         Station:  -1         Position:  mid             ASSESSMENT & PLAN:    Continue routine labor and pitocin orders  Anticipate    Dr Steph Munguia updated with patients progress.
Department of Obstetrics and Gynecology  Labor and Delivery       Post Partum Progress Note             SUBJECTIVE:  Pt decided it was to late to leave and she anted to stay last evening. Pt also was noted to have positive chlamydia and they called this report to [facility] and she was treated with oral azithromycin.      OBJECTIVE:      Vitals:  /64   Pulse 77   Temp 97.8 °F (36.6 °C) (Oral)   Resp 16   Ht 5' 4\" (1.626 m)   Wt 188 lb (85.3 kg)   LMP 2020   SpO2 100%   Breastfeeding Unknown   BMI 32.27 kg/m²   Patient Vitals for the past 24 hrs:   BP Temp Temp src Pulse Resp   20 0724 103/64 97.8 °F (36.6 °C) Oral 77 16   20 0031 111/61 97.8 °F (36.6 °C) Oral 75 16   20 2006 106/66 98.1 °F (36.7 °C) Oral 83 16   20 1538 113/74 97.9 °F (36.6 °C) Oral 79 16   20 1214 (!) 106/59 97.7 °F (36.5 °C) Oral 73 16         ABDOMEN: normal shape, position and consistency  GENITAL/URINARY:  External Genitalia:  General appearance; normal, Hair distribution; normal, Lesions absent  Uterus:  Size normal, Contour normal  Breast:normal appearance, no masses or tenderness  Cor: RRR no Murmurs  Pulmonary: clear to auscultation anterior and posterior  Extremities: no Clubbing cyanosis or ecchymosis    DATA:    Positive chlamydia      ASSESSMENT :      Active Problems:    Encounter for induction of labor      Term pregnancy       (normal spontaneous vaginal delivery)          Plan:  Discharge home today
Patient laying in right tilt position after receiving her epidural.  SVE done at this time 3/90/-1. Patient comfortable. I will return in about 30-40 minutes to perform AROM.   PVU
Pt desires an epidural at this time.
Wendy Reasons, CNM in pt's room for SVE and AROM
118/79 -- -- 66 18 --   11/11/20 1015 136/66 -- -- 63 18 --   11/11/20 0945 127/77 -- -- 64 18 --   11/11/20 0915 (!) 140/74 -- -- 68 18 --   11/11/20 0847 127/72 -- -- 61 18 --         ABDOMEN: normal shape, position and consistency  GENITAL/URINARY:  External Genitalia:  General appearance; normal, Hair distribution; normal, Lesions absent  Uterus:  Size normal, Contour normal  Breast:normal appearance, no masses or tenderness  Cor: RRR no Murmurs  Pulmonary: clear to auscultation anterior and posterior  Extremities: no Clubbing cyanosis or ecchymosis    DATA:          ASSESSMENT :      Active Problems:    Encounter for induction of labor      Term pregnancy          Plan:  Discharge home